# Patient Record
Sex: FEMALE | Race: WHITE | NOT HISPANIC OR LATINO | Employment: FULL TIME | URBAN - METROPOLITAN AREA
[De-identification: names, ages, dates, MRNs, and addresses within clinical notes are randomized per-mention and may not be internally consistent; named-entity substitution may affect disease eponyms.]

---
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
## 2021-12-27 ENCOUNTER — HOSPITAL ENCOUNTER (EMERGENCY)
Facility: HOSPITAL | Age: 21
Discharge: LEFT BEFORE DISCHARGE | End: 2021-12-27

## 2021-12-27 PROCEDURE — 99211 OFF/OP EST MAY X REQ PHY/QHP: CPT

## 2022-07-07 ENCOUNTER — APPOINTMENT (OUTPATIENT)
Dept: CT IMAGING | Facility: HOSPITAL | Age: 22
End: 2022-07-07

## 2022-07-07 ENCOUNTER — HOSPITAL ENCOUNTER (EMERGENCY)
Facility: HOSPITAL | Age: 22
Discharge: HOME | End: 2022-07-07
Admitting: EMERGENCY MEDICINE

## 2022-07-07 VITALS
DIASTOLIC BLOOD PRESSURE: 84 MMHG | HEIGHT: 63 IN | BODY MASS INDEX: 30.47 KG/M2 | OXYGEN SATURATION: 100 % | SYSTOLIC BLOOD PRESSURE: 132 MMHG | HEART RATE: 88 BPM | TEMPERATURE: 98 F | RESPIRATION RATE: 12 BRPM | WEIGHT: 171.96 LBS

## 2022-07-07 DIAGNOSIS — N20.0 KIDNEY STONE: ICD-10-CM

## 2022-07-07 DIAGNOSIS — N39.0 URINARY TRACT INFECTION WITH HEMATURIA, SITE UNSPECIFIED: ICD-10-CM

## 2022-07-07 DIAGNOSIS — R10.9 FLANK PAIN: Primary | ICD-10-CM

## 2022-07-07 DIAGNOSIS — R31.9 URINARY TRACT INFECTION WITH HEMATURIA, SITE UNSPECIFIED: ICD-10-CM

## 2022-07-07 LAB
ABO GROUP BLD: NORMAL
ALBUMIN SERPL-MCNC: 4.7 G/DL (ref 3.5–5.2)
ALBUMIN/GLOB SERPL: 1.7 G/DL
ALP SERPL-CCNC: 89 U/L (ref 39–117)
ALT SERPL W P-5'-P-CCNC: 22 U/L (ref 1–33)
ANION GAP SERPL CALCULATED.3IONS-SCNC: 14 MMOL/L (ref 5–15)
AST SERPL-CCNC: 19 U/L (ref 1–32)
B-HCG UR QL: NEGATIVE
BACTERIA UR QL AUTO: ABNORMAL /HPF
BASOPHILS # BLD AUTO: 0 10*3/MM3 (ref 0–0.2)
BASOPHILS NFR BLD AUTO: 0.4 % (ref 0–1.5)
BILIRUB SERPL-MCNC: 0.4 MG/DL (ref 0–1.2)
BILIRUB UR QL STRIP: NEGATIVE
BLD GP AB SCN SERPL QL: NEGATIVE
BUN SERPL-MCNC: 12 MG/DL (ref 6–20)
BUN/CREAT SERPL: 18.2 (ref 7–25)
CALCIUM SPEC-SCNC: 9.1 MG/DL (ref 8.6–10.5)
CHLORIDE SERPL-SCNC: 100 MMOL/L (ref 98–107)
CLARITY UR: ABNORMAL
CO2 SERPL-SCNC: 22 MMOL/L (ref 22–29)
COLOR UR: YELLOW
CREAT SERPL-MCNC: 0.66 MG/DL (ref 0.57–1)
DEPRECATED RDW RBC AUTO: 37.6 FL (ref 37–54)
EGFRCR SERPLBLD CKD-EPI 2021: 128.2 ML/MIN/1.73
EOSINOPHIL # BLD AUTO: 0 10*3/MM3 (ref 0–0.4)
EOSINOPHIL NFR BLD AUTO: 0.5 % (ref 0.3–6.2)
ERYTHROCYTE [DISTWIDTH] IN BLOOD BY AUTOMATED COUNT: 12.5 % (ref 12.3–15.4)
GLOBULIN UR ELPH-MCNC: 2.7 GM/DL
GLUCOSE SERPL-MCNC: 100 MG/DL (ref 65–99)
GLUCOSE UR STRIP-MCNC: NEGATIVE MG/DL
HCT VFR BLD AUTO: 40.1 % (ref 34–46.6)
HGB BLD-MCNC: 13.8 G/DL (ref 12–15.9)
HGB UR QL STRIP.AUTO: ABNORMAL
HOLD SPECIMEN: NORMAL
HOLD SPECIMEN: NORMAL
HYALINE CASTS UR QL AUTO: ABNORMAL /LPF
KETONES UR QL STRIP: NEGATIVE
LEUKOCYTE ESTERASE UR QL STRIP.AUTO: ABNORMAL
LYMPHOCYTES # BLD AUTO: 2.1 10*3/MM3 (ref 0.7–3.1)
LYMPHOCYTES NFR BLD AUTO: 29.3 % (ref 19.6–45.3)
MCH RBC QN AUTO: 29.5 PG (ref 26.6–33)
MCHC RBC AUTO-ENTMCNC: 34.5 G/DL (ref 31.5–35.7)
MCV RBC AUTO: 85.4 FL (ref 79–97)
MONOCYTES # BLD AUTO: 0.4 10*3/MM3 (ref 0.1–0.9)
MONOCYTES NFR BLD AUTO: 5 % (ref 5–12)
NEUTROPHILS NFR BLD AUTO: 4.6 10*3/MM3 (ref 1.7–7)
NEUTROPHILS NFR BLD AUTO: 64.8 % (ref 42.7–76)
NITRITE UR QL STRIP: NEGATIVE
NRBC BLD AUTO-RTO: 0 /100 WBC (ref 0–0.2)
PH UR STRIP.AUTO: 5.5 [PH] (ref 5–8)
PLATELET # BLD AUTO: 319 10*3/MM3 (ref 140–450)
PMV BLD AUTO: 7.6 FL (ref 6–12)
POTASSIUM SERPL-SCNC: 3.9 MMOL/L (ref 3.5–5.2)
PROT SERPL-MCNC: 7.4 G/DL (ref 6–8.5)
PROT UR QL STRIP: ABNORMAL
RBC # BLD AUTO: 4.69 10*6/MM3 (ref 3.77–5.28)
RBC # UR STRIP: ABNORMAL /HPF
REF LAB TEST METHOD: ABNORMAL
RH BLD: POSITIVE
SODIUM SERPL-SCNC: 136 MMOL/L (ref 136–145)
SP GR UR STRIP: 1.02 (ref 1–1.03)
SQUAMOUS #/AREA URNS HPF: ABNORMAL /HPF
T&S EXPIRATION DATE: NORMAL
UROBILINOGEN UR QL STRIP: ABNORMAL
WBC # UR STRIP: ABNORMAL /HPF
WBC NRBC COR # BLD: 7.1 10*3/MM3 (ref 3.4–10.8)
WHOLE BLOOD HOLD COAG: NORMAL
WHOLE BLOOD HOLD SPECIMEN: NORMAL

## 2022-07-07 PROCEDURE — 80053 COMPREHEN METABOLIC PANEL: CPT | Performed by: NURSE PRACTITIONER

## 2022-07-07 PROCEDURE — 74177 CT ABD & PELVIS W/CONTRAST: CPT

## 2022-07-07 PROCEDURE — 0 IOPAMIDOL PER 1 ML: Performed by: NURSE PRACTITIONER

## 2022-07-07 PROCEDURE — 96374 THER/PROPH/DIAG INJ IV PUSH: CPT

## 2022-07-07 PROCEDURE — 81001 URINALYSIS AUTO W/SCOPE: CPT | Performed by: NURSE PRACTITIONER

## 2022-07-07 PROCEDURE — 86900 BLOOD TYPING SEROLOGIC ABO: CPT

## 2022-07-07 PROCEDURE — 86900 BLOOD TYPING SEROLOGIC ABO: CPT | Performed by: NURSE PRACTITIONER

## 2022-07-07 PROCEDURE — 85025 COMPLETE CBC W/AUTO DIFF WBC: CPT | Performed by: NURSE PRACTITIONER

## 2022-07-07 PROCEDURE — 87147 CULTURE TYPE IMMUNOLOGIC: CPT | Performed by: NURSE PRACTITIONER

## 2022-07-07 PROCEDURE — 86901 BLOOD TYPING SEROLOGIC RH(D): CPT | Performed by: NURSE PRACTITIONER

## 2022-07-07 PROCEDURE — 87086 URINE CULTURE/COLONY COUNT: CPT | Performed by: NURSE PRACTITIONER

## 2022-07-07 PROCEDURE — 25010000002 MORPHINE PER 10 MG: Performed by: NURSE PRACTITIONER

## 2022-07-07 PROCEDURE — 99283 EMERGENCY DEPT VISIT LOW MDM: CPT

## 2022-07-07 PROCEDURE — 81025 URINE PREGNANCY TEST: CPT | Performed by: NURSE PRACTITIONER

## 2022-07-07 PROCEDURE — 86901 BLOOD TYPING SEROLOGIC RH(D): CPT

## 2022-07-07 PROCEDURE — 86850 RBC ANTIBODY SCREEN: CPT | Performed by: NURSE PRACTITIONER

## 2022-07-07 RX ORDER — SODIUM CHLORIDE 0.9 % (FLUSH) 0.9 %
10 SYRINGE (ML) INJECTION AS NEEDED
Status: DISCONTINUED | OUTPATIENT
Start: 2022-07-07 | End: 2022-07-07 | Stop reason: HOSPADM

## 2022-07-07 RX ORDER — DOXYLAMINE SUCCINATE AND PYRIDOXINE HYDROCHLORIDE, DELAYED RELEASE TABLETS 10 MG/10 MG 10; 10 MG/1; MG/1
2 TABLET, DELAYED RELEASE ORAL 2 TIMES DAILY PRN
Qty: 20 TABLET | Refills: 0 | Status: SHIPPED | OUTPATIENT
Start: 2022-07-07

## 2022-07-07 RX ORDER — ONDANSETRON 2 MG/ML
4 INJECTION INTRAMUSCULAR; INTRAVENOUS EVERY 6 HOURS PRN
Status: DISCONTINUED | OUTPATIENT
Start: 2022-07-07 | End: 2022-07-07 | Stop reason: HOSPADM

## 2022-07-07 RX ORDER — NITROFURANTOIN 25; 75 MG/1; MG/1
100 CAPSULE ORAL 2 TIMES DAILY
Qty: 10 CAPSULE | Refills: 0 | OUTPATIENT
Start: 2022-07-07 | End: 2022-08-09

## 2022-07-07 RX ORDER — MORPHINE SULFATE 4 MG/ML
4 INJECTION, SOLUTION INTRAMUSCULAR; INTRAVENOUS ONCE
Status: COMPLETED | OUTPATIENT
Start: 2022-07-07 | End: 2022-07-07

## 2022-07-07 RX ADMIN — SODIUM CHLORIDE 1000 ML: 9 INJECTION, SOLUTION INTRAVENOUS at 09:24

## 2022-07-07 RX ADMIN — MORPHINE SULFATE 4 MG: 4 INJECTION INTRAVENOUS at 09:16

## 2022-07-07 RX ADMIN — IOPAMIDOL 100 ML: 755 INJECTION, SOLUTION INTRAVENOUS at 10:07

## 2022-07-07 NOTE — ED PROVIDER NOTES
Subjective        Chief complaint: Right-sided flank pain      Context: Patient is a 21-year-old female who comes in complaining of right-sided flank pain that started around 6:00 this morning.  She denies any fever upper respiratory complaints.  Has had some nausea without vomiting or diarrhea and had a normal bowel movement today.  Has not needed drink since yesterday.  She denies any urinary complaints unusual vaginal bleeding or discharge.  She states she did have her third round of IUI 5 days ago and is on letrozole and progesterone and follows with Dr. Woods for fertility.  She has no history of kidney stones.  Denies any association with food.  Denies any melena hematochezia.  No rash.  No recent injury.    Location: Right flank radiating to the right abdomen  Duration: 630 this morning  Timing: Waxes and wanes  Quality/Severity: Moderate to severe  Modifying factors: Denies  Associated symptoms: Denies        Additional hx provided by: Family member    PCP:    LNMP:  G1 A1 (spontaneous miscarriage 2 years ago)                 Review of Systems   Constitutional: Negative for fever.   HENT: Negative.    Eyes: Negative for visual disturbance.   Skin: Negative for rash.   Allergic/Immunologic: Negative for immunocompromised state.   Hematological: Does not bruise/bleed easily.   Psychiatric/Behavioral: Negative for confusion.       No past medical history on file.    Allergies   Allergen Reactions   • Penicillins Angioedema       No past surgical history on file.    No family history on file.    Social History     Socioeconomic History   • Marital status:            Objective   Physical Exam     Vital signs and triage nurse note reviewed.  Constitutional: Awake, alert; uncomfortable and tearful  HEENT: Normocephalic, atraumatic; pupils are PERRL with intact EOM; oropharynx is pink and moist without exudate or erythema.  Neck: Supple, full range of motion without pain;    Cardiovascular: Regular rate and  rhythm, normal S1-S2.  Pulmonary: Respiratory effort regular nonlabored, breath sounds clear to auscultation all fields.  Abdomen: Soft, nontender nondistended with normoactive bowel sounds; no rebound or guarding.Right CVAT  Negative Panchal McBurney no peritoneal rigidity or guarding  Musculoskeletal: Independent range of motion of all extremities with no palpable tenderness or edema.  Neuro: Alert oriented x3, speech is clear and appropriate, GCS 15  Skin:  Fleshtone warm, dry, intact; no erythematous or petechial rash or lesion      Procedures           ED Course            Labs Reviewed   URINALYSIS W/ CULTURE IF INDICATED - Abnormal; Notable for the following components:       Result Value    Appearance, UA Cloudy (*)     Blood, UA Large (3+) (*)     Protein, UA Trace (*)     Leuk Esterase, UA Small (1+) (*)     All other components within normal limits    Narrative:     Result checked    In absence of clinical symptoms, the presence of pyuria, bacteria, and/or nitrites on the urinalysis result does not correlate with infection.   URINALYSIS, MICROSCOPIC ONLY - Abnormal; Notable for the following components:    RBC, UA Too Numerous to Count (*)     WBC, UA 13-20 (*)     Bacteria, UA 1+ (*)     Squamous Epithelial Cells, UA 13-20 (*)     All other components within normal limits    Narrative:     Result checked     COMPREHENSIVE METABOLIC PANEL - Abnormal; Notable for the following components:    Glucose 100 (*)     All other components within normal limits    Narrative:     GFR Normal >60  Chronic Kidney Disease <60  Kidney Failure <15     PREGNANCY, URINE - Normal   CBC WITH AUTO DIFFERENTIAL - Normal   URINE CULTURE   RAINBOW DRAW    Narrative:     The following orders were created for panel order [place] Draw.  Procedure                               Abnormality         Status                     ---------                               -----------         ------                     Green Top (Gel)[[id number]]                                   Final result               Lavender Top[[id number]]                                     Final result               Gold Top - SST[[id number]]                                   Final result               Light Blue Top[[id number]]                                   Final result                 Please view results for these tests on the individual orders.   TYPE AND SCREEN   CBC AND DIFFERENTIAL    Narrative:     The following orders were created for panel order CBC & Differential.  Procedure                               Abnormality         Status                     ---------                               -----------         ------                     CBC Auto Differential[[id number]]        Normal              Final result                 Please view results for these tests on the individual orders.   GREEN TOP   LAVENDER TOP   GOLD TOP - SST   LIGHT BLUE TOP     Medications   sodium chloride 0.9 % flush 10 mL (has no administration in time range)   ondansetron (ZOFRAN) injection 4 mg (has no administration in time range)   Morphine sulfate (PF) injection 4 mg (4 mg Intravenous Given 7/7/22 0916)   sodium chloride 0.9 % bolus 1,000 mL (1,000 mL Intravenous New Bag 7/7/22 0924)   iopamidol (ISOVUE-370) 76 % injection 100 mL (100 mL Intravenous Given 7/7/22 1007)     CT Abdomen Pelvis With Contrast    Result Date: 7/7/2022  1. Calculus measuring 3 mm at the right renal pelvis resulting in mild right hydronephrosis. 2. Punctate nonobstructing left nephrolithiasis. 3. Normal appendix.    Electronically Signed By-Cal Tyler MD On:7/7/2022 10:20 AM This report was finalized on [id number] by  Cal Tyler MD.    Prior to Admission medications    Medication Sig Start Date End Date Taking? Authorizing Provider   doxylamine-pyridoxine (DICLEGIS) 10-10 MG tablet delayed-release EC tablet Take 2 tablets by mouth 2 (Two) Times a Day As Needed (n/v). 7/7/22   Gladis Gramajo APRN  "  nitrofurantoin, macrocrystal-monohydrate, (MACROBID) 100 MG capsule Take 1 capsule by mouth 2 (Two) Times a Day. 7/7/22   Gladis Gramajo APRN                                       MDM  Number of Diagnoses or Management Options  Flank pain  Kidney stone  Urinary tract infection with hematuria, site unspecified  Diagnosis management comments: /96 (BP Location: Right arm, Patient Position: Lying)   Pulse 96   Temp 98.7 °F (37.1 °C) (Oral)   Resp 12   Ht 160 cm (63\")   Wt 78 kg (171 lb 15.3 oz)   SpO2 99%   BMI 30.46 kg/m²             Comorbidities: Infertility  Differentials: Kidney stone appendicitis ectopic not all inclusive of differentials considered  Radiology interpretation:  X-rays reviewed by me and interpreted by radiologist,   CT Abdomen Pelvis With Contrast    Result Date: 7/7/2022  1. Calculus measuring 3 mm at the right renal pelvis resulting in mild right hydronephrosis. 2. Punctate nonobstructing left nephrolithiasis. 3. Normal appendix.    Electronically Signed By-Cal Tyler MD On:7/7/2022 10:20 AM This report was finalized on [id number] by  Cal Tyler MD.    Lab interpretation:  Labs viewed by me significant for, UTI but potentially contaminated    Appropriate PPE worn during exam.  Patient had an IV established and labs obtained.  We discussed the risk of imaging as well as medications with potential pregnancy patient acknowledges this and wishes to proceed with the evaluation.  States she has a follow-up with her fertility specialist next week and will have repeat ultrasound done there.  She was offered analgesic prescription which she refused and states she will take Tylenol.  She was given a prescription for Diclegis and Macrobid awaiting urine culture.  On reexam she states she is much better    i discussed findings with patient and family member who voices understanding of discharge instructions, signs and symptoms requiring return to ED; discharged improved and in " stable condition with follow up for re-evaluation.    This document is intended for medical expert use only. Reading of this document by patients and/or patient's family without participating medical staff guidance may result in misinterpretation and unintended morbidity.  Any interpretation of such data is the responsibility of the patient and/or family member responsible for the patient in concert with their primary or specialist providers, not to be left for sources of online searches such as [employer], [employer] or similar queries. Relying on these approaches to knowledge may result in misinterpretation, misguided goals of care and even death should patients or family members try recommendations outside of the realm of professional medical care in a supervised inpatient environment.         Final diagnoses:   Flank pain   Kidney stone   Urinary tract infection with hematuria, site unspecified       ED Disposition  ED Disposition     ED Disposition   Discharge    Condition   Stable    Comment   --             Farhat Kay MD  [address] [place] [address] [place] [address] [phone]    Schedule an appointment as soon as possible for a visit   Urology         Medication List      New Prescriptions    doxylamine-pyridoxine 10-10 MG tablet delayed-release EC tablet  Commonly known as: DICLEGIS  Take 2 tablets by mouth 2 (Two) Times a Day As Needed (n/v).     nitrofurantoin (macrocrystal-monohydrate) 100 MG capsule  Commonly known as: MACROBID  Take 1 capsule by mouth 2 (Two) Times a Day.           Where to Get Your Medications      These medications were sent to [facility]/pharmacy #[id number] [place], [place]  [address] [place] [address] AT [address] - [phone] [facility] [phone] [place] [address]    Phone: [phone]   · doxylamine-pyridoxine 10-10 MG tablet delayed-release EC tablet  · nitrofurantoin (macrocrystal-monohydrate) 100 MG capsule          Gladis Gramajo, APRN  07/07/22 1056

## 2022-07-07 NOTE — ED NOTES
Pt c/o right sided flank pain that radiates to the front of her abd. Pt reports that she just started IUI on Saturday and is taking progesterone suppositories.

## 2022-07-07 NOTE — DISCHARGE INSTRUCTIONS
Medications as directed.  Follow-up with your PCP and fertility specialist.  Return for any new or worsening symptoms.  Tylenol as needed.

## 2022-07-08 LAB — BACTERIA SPEC AEROBE CULT: ABNORMAL

## 2022-07-08 NOTE — PHARMACY RECOMMENDATION
Patient urine culture resulted with Streptococcus (Group B). Preferred treatment is usually penicillins however patient is penicillin allergic. Patient was given Rx for nitrofurantoin. This is usually not a preferred therapy however can be used in pregnancy for GBS when patient has allergies to PCN, and this patient is currently undergoing IVF. After discussion with CAITY Tubbs it was decided to call patient and see how she was doing. Patient stated no urinary symptoms at this time. Therapy is appropriate coverage. No further follow-up required..    Microbiology Results (last 10 days)       Procedure Component Value - Date/Time    Urine Culture - Urine, Urine, Clean Catch [[id number]]  (Abnormal) Collected: 07/07/22 0858    Lab Status: Final result Specimen: Urine, Clean Catch Updated: 07/08/22 1104     Urine Culture >100,000 CFU/mL Streptococcus agalactiae (Group B)     Comment:   This organism is considered to be universally susceptible to penicillin.  No further antibiotic testing will be performed.       Narrative:      Colonization of the urinary tract without infection is common. Treatment is discouraged unless the patient is symptomatic, pregnant, or undergoing an invasive urologic procedure.            Marilee See RPH  7/8/2022 13:54 EDT

## 2022-07-15 ENCOUNTER — HOSPITAL ENCOUNTER (OUTPATIENT)
Dept: GENERAL RADIOLOGY | Facility: HOSPITAL | Age: 22
Discharge: HOME | End: 2022-07-15

## 2022-07-15 DIAGNOSIS — R52 PAIN: ICD-10-CM

## 2022-08-09 ENCOUNTER — HOSPITAL ENCOUNTER (EMERGENCY)
Facility: HOSPITAL | Age: 22
Discharge: HOME | End: 2022-08-09
Attending: EMERGENCY MEDICINE | Admitting: EMERGENCY MEDICINE

## 2022-08-09 VITALS
OXYGEN SATURATION: 97 % | WEIGHT: 169.2 LBS | BODY MASS INDEX: 29.98 KG/M2 | HEIGHT: 63 IN | RESPIRATION RATE: 16 BRPM | TEMPERATURE: 98.1 F | DIASTOLIC BLOOD PRESSURE: 66 MMHG | SYSTOLIC BLOOD PRESSURE: 106 MMHG | HEART RATE: 84 BPM

## 2022-08-09 DIAGNOSIS — O26.899 FLANK PAIN IN PREGNANT PATIENT: ICD-10-CM

## 2022-08-09 DIAGNOSIS — O23.41 UTI (URINARY TRACT INFECTION) IN PREGNANCY IN FIRST TRIMESTER: Primary | ICD-10-CM

## 2022-08-09 DIAGNOSIS — R10.9 FLANK PAIN IN PREGNANT PATIENT: ICD-10-CM

## 2022-08-09 LAB
BACTERIA UR QL AUTO: ABNORMAL /HPF
BILIRUB UR QL STRIP: NEGATIVE
CLARITY UR: ABNORMAL
COLOR UR: YELLOW
GLUCOSE UR STRIP-MCNC: NEGATIVE MG/DL
HGB UR QL STRIP.AUTO: ABNORMAL
HYALINE CASTS UR QL AUTO: ABNORMAL /LPF
KETONES UR QL STRIP: ABNORMAL
LEUKOCYTE ESTERASE UR QL STRIP.AUTO: ABNORMAL
NITRITE UR QL STRIP: NEGATIVE
PH UR STRIP.AUTO: 6 [PH] (ref 5–8)
PROT UR QL STRIP: ABNORMAL
RBC # UR STRIP: ABNORMAL /HPF
REF LAB TEST METHOD: ABNORMAL
SP GR UR STRIP: 1.02 (ref 1–1.03)
SQUAMOUS #/AREA URNS HPF: ABNORMAL /HPF
UROBILINOGEN UR QL STRIP: ABNORMAL
WBC # UR STRIP: ABNORMAL /HPF

## 2022-08-09 PROCEDURE — 81001 URINALYSIS AUTO W/SCOPE: CPT | Performed by: EMERGENCY MEDICINE

## 2022-08-09 PROCEDURE — 87086 URINE CULTURE/COLONY COUNT: CPT | Performed by: EMERGENCY MEDICINE

## 2022-08-09 PROCEDURE — 99283 EMERGENCY DEPT VISIT LOW MDM: CPT

## 2022-08-09 PROCEDURE — 63710000001 ONDANSETRON ODT 4 MG TABLET DISPERSIBLE: Performed by: NURSE PRACTITIONER

## 2022-08-09 RX ORDER — CEFDINIR 300 MG/1
300 CAPSULE ORAL 2 TIMES DAILY
Qty: 10 CAPSULE | Refills: 0 | Status: SHIPPED | OUTPATIENT
Start: 2022-08-09

## 2022-08-09 RX ORDER — HYDROCODONE BITARTRATE AND ACETAMINOPHEN 5; 325 MG/1; MG/1
1 TABLET ORAL ONCE AS NEEDED
Status: COMPLETED | OUTPATIENT
Start: 2022-08-09 | End: 2022-08-09

## 2022-08-09 RX ORDER — ONDANSETRON 4 MG/1
4 TABLET, ORALLY DISINTEGRATING ORAL ONCE
Status: COMPLETED | OUTPATIENT
Start: 2022-08-09 | End: 2022-08-09

## 2022-08-09 RX ORDER — CEFDINIR 300 MG/1
300 CAPSULE ORAL ONCE
Status: COMPLETED | OUTPATIENT
Start: 2022-08-09 | End: 2022-08-09

## 2022-08-09 RX ADMIN — CEFDINIR 300 MG: 300 CAPSULE ORAL at 03:24

## 2022-08-09 RX ADMIN — HYDROCODONE BITARTRATE AND ACETAMINOPHEN 1 TABLET: 5; 325 TABLET ORAL at 03:24

## 2022-08-09 RX ADMIN — ONDANSETRON 4 MG: 4 TABLET, ORALLY DISINTEGRATING ORAL at 03:24

## 2022-08-09 NOTE — DISCHARGE INSTRUCTIONS
Push clear liquids    Antibiotics till gone    Have your urine rechecked at the OB/GYN's next Wednesday to ensure the infection is gone    Use Tylenol as needed for pain    Return if worse

## 2022-08-09 NOTE — ED PROVIDER NOTES
Subjective   Patient is a 21-year-old female that states she is 7 weeks and 2 days pregnant-states that about 10 days ago she took Macrobid for UTI she states she is still having flank pain and discomfort with urination.-She rates that pain at 6/10.  She states it is a dull aching burning pain worse with urination does not radiate states it is constant but it does wax and wane in intensity.  She has had no fever no chills no nausea or vomiting.            Review of Systems   Constitutional: Negative for chills, fatigue and fever.   HENT: Negative for congestion, tinnitus and trouble swallowing.    Eyes: Negative for photophobia, discharge and redness.   Respiratory: Negative for cough and shortness of breath.    Cardiovascular: Negative for chest pain and palpitations.   Gastrointestinal: Negative for abdominal pain, diarrhea, nausea and vomiting.   Genitourinary: Positive for dysuria and flank pain. Negative for frequency and urgency.   Musculoskeletal: Positive for back pain. Negative for joint swelling and myalgias.   Skin: Negative for rash.   Neurological: Negative for dizziness and headaches.   Psychiatric/Behavioral: Negative for confusion.   All other systems reviewed and are negative.      History reviewed. No pertinent past medical history.    Allergies   Allergen Reactions   • Penicillins Angioedema   • Penicillins Hives       History reviewed. No pertinent surgical history.    History reviewed. No pertinent family history.    Social History     Socioeconomic History   • Marital status:            Objective   Physical Exam  Vitals reviewed.   Constitutional:       Appearance: Normal appearance. She is well-developed and normal weight.   HENT:      Head: Normocephalic and atraumatic.      Right Ear: External ear normal.      Left Ear: External ear normal.      Nose: Nose normal.      Mouth/Throat:      Mouth: Mucous membranes are moist.   Eyes:      Conjunctiva/sclera: Conjunctivae normal.       "Pupils: Pupils are equal, round, and reactive to light.   Cardiovascular:      Rate and Rhythm: Normal rate and regular rhythm.      Heart sounds: Normal heart sounds.   Pulmonary:      Effort: Pulmonary effort is normal. No respiratory distress.      Breath sounds: Normal breath sounds. No wheezing.   Abdominal:      General: Bowel sounds are normal. There is no distension.      Palpations: Abdomen is soft. There is no mass.      Tenderness: There is abdominal tenderness in the suprapubic area. There is no right CVA tenderness, left CVA tenderness, guarding or rebound.   Musculoskeletal:         General: No deformity. Normal range of motion.      Cervical back: Normal range of motion and neck supple.   Skin:     General: Skin is warm and dry.      Capillary Refill: Capillary refill takes less than 2 seconds.   Neurological:      Mental Status: She is alert and oriented to person, place, and time.      GCS: GCS eye subscore is 4. GCS verbal subscore is 5. GCS motor subscore is 6.      Cranial Nerves: No cranial nerve deficit.      Sensory: No sensory deficit.      Deep Tendon Reflexes: Reflexes normal.   Psychiatric:         Attention and Perception: Attention normal.         Mood and Affect: Mood normal.         Speech: Speech normal.         Behavior: Behavior normal. Behavior is cooperative.         Procedures           ED Course      /70 (BP Location: Left arm, Patient Position: Sitting)   Pulse 93   Temp 98.1 °F (36.7 °C) (Oral)   Resp 16   Ht 160 cm (63\")   Wt 76.7 kg (169 lb 3.2 oz)   SpO2 99%   BMI 29.97 kg/m²   Labs Reviewed   URINALYSIS W/ CULTURE IF INDICATED - Abnormal; Notable for the following components:       Result Value    Appearance, UA Cloudy (*)     Ketones, UA 15 mg/dL (1+) (*)     Blood, UA Moderate (2+) (*)     Protein, UA Trace (*)     Leuk Esterase, UA Small (1+) (*)     All other components within normal limits    Narrative:     In absence of clinical symptoms, the presence of " pyuria, bacteria, and/or nitrites on the urinalysis result does not correlate with infection.   URINALYSIS, MICROSCOPIC ONLY - Abnormal; Notable for the following components:    RBC, UA 31-50 (*)     WBC, UA 6-12 (*)     All other components within normal limits   URINE CULTURE     Medications   cefdinir (OMNICEF) capsule 300 mg (has no administration in time range)   HYDROcodone-acetaminophen (NORCO) 5-325 MG per tablet 1 tablet (has no administration in time range)   ondansetron ODT (ZOFRAN-ODT) disintegrating tablet 4 mg (has no administration in time range)     No radiology results for the last day                                       MDM  Number of Diagnoses or Management Options  Flank pain in pregnant patient  UTI (urinary tract infection) in pregnancy in first trimester  Diagnosis management comments: Patient had above exam and urinalysis was found to have 6-12 WBCs as well as red blood cells.-The patient's symptoms are consistent with continued cystitis versus UTI during pregnancy.  I feel that it is best at this time to change the antibiotic to cefdinir.-Patient just finished Macrobid and states that she has tolerated Keflex in the past.-She was agreeable this plan of care she was given 1 Norco and Zofran here for pain control she was advised after that she will have to use Tylenol at home she was agreeable to this she will have her urine rechecked at her next OB appointment which is on Wednesday-we will return if worse she verbalized understood discharge instruction       Amount and/or Complexity of Data Reviewed  Clinical lab tests: reviewed    Risk of Complications, Morbidity, and/or Mortality  Presenting problems: high  Diagnostic procedures: high  Management options: high    Patient Progress  Patient progress: improved      Final diagnoses:   UTI (urinary tract infection) in pregnancy in first trimester   Flank pain in pregnant patient       ED Disposition  ED Disposition     ED Disposition    Discharge    Condition   Stable    Comment   --             No follow-up provider specified.       Medication List      New Prescriptions    cefdinir 300 MG capsule  Commonly known as: OMNICEF  Take 1 capsule by mouth 2 (Two) Times a Day.        Stop    nitrofurantoin (macrocrystal-monohydrate) 100 MG capsule  Commonly known as: MACROBID           Where to Get Your Medications      These medications were sent to [facility] #[id number] - [place], [place] - [address] [phone] [facility] [phone] [place] [address]    Phone: [phone]   · cefdinir 300 MG capsule          Emily Gramajo, APRN  08/09/22 [id number]

## 2022-08-10 LAB — BACTERIA SPEC AEROBE CULT: NO GROWTH
